# Patient Record
Sex: FEMALE | Race: BLACK OR AFRICAN AMERICAN | Employment: UNEMPLOYED | URBAN - METROPOLITAN AREA
[De-identification: names, ages, dates, MRNs, and addresses within clinical notes are randomized per-mention and may not be internally consistent; named-entity substitution may affect disease eponyms.]

---

## 2017-11-29 ENCOUNTER — HOSPITAL ENCOUNTER (EMERGENCY)
Age: 52
Discharge: HOME | End: 2017-11-29
Attending: EMERGENCY MEDICINE
Payer: SELF-PAY

## 2017-11-29 ENCOUNTER — APPOINTMENT (OUTPATIENT)
Dept: GENERAL RADIOLOGY | Age: 52
End: 2017-11-29
Attending: PHYSICIAN ASSISTANT
Payer: SELF-PAY

## 2017-11-29 VITALS
WEIGHT: 172 LBS | HEART RATE: 85 BPM | SYSTOLIC BLOOD PRESSURE: 132 MMHG | OXYGEN SATURATION: 100 % | TEMPERATURE: 98.6 F | DIASTOLIC BLOOD PRESSURE: 74 MMHG | BODY MASS INDEX: 28.66 KG/M2 | HEIGHT: 65 IN | RESPIRATION RATE: 16 BRPM

## 2017-11-29 DIAGNOSIS — K59.00 CONSTIPATION, UNSPECIFIED CONSTIPATION TYPE: ICD-10-CM

## 2017-11-29 DIAGNOSIS — E87.6 HYPOKALEMIA: ICD-10-CM

## 2017-11-29 DIAGNOSIS — R10.11 RUQ ABDOMINAL PAIN: Primary | ICD-10-CM

## 2017-11-29 DIAGNOSIS — B37.31 VAGINAL YEAST INFECTION: ICD-10-CM

## 2017-11-29 DIAGNOSIS — R11.0 NAUSEA WITHOUT VOMITING: ICD-10-CM

## 2017-11-29 LAB
ALBUMIN SERPL-MCNC: 4.1 G/DL (ref 3.4–5)
ALBUMIN/GLOB SERPL: 0.8 {RATIO} (ref 0.8–1.7)
ALP SERPL-CCNC: 108 U/L (ref 45–117)
ALT SERPL-CCNC: 31 U/L (ref 13–56)
ANION GAP SERPL CALC-SCNC: 11 MMOL/L (ref 3–18)
APPEARANCE UR: CLEAR
AST SERPL-CCNC: 27 U/L (ref 15–37)
BASOPHILS # BLD: 0 K/UL (ref 0–0.06)
BASOPHILS NFR BLD: 0 % (ref 0–2)
BILIRUB SERPL-MCNC: 0.4 MG/DL (ref 0.2–1)
BILIRUB UR QL: NEGATIVE
BUN SERPL-MCNC: 11 MG/DL (ref 7–18)
BUN/CREAT SERPL: 13 (ref 12–20)
CALCIUM SERPL-MCNC: 9.3 MG/DL (ref 8.5–10.1)
CHLORIDE SERPL-SCNC: 102 MMOL/L (ref 100–108)
CO2 SERPL-SCNC: 27 MMOL/L (ref 21–32)
COLOR UR: YELLOW
CREAT SERPL-MCNC: 0.86 MG/DL (ref 0.6–1.3)
DIFFERENTIAL METHOD BLD: ABNORMAL
EOSINOPHIL # BLD: 0.1 K/UL (ref 0–0.4)
EOSINOPHIL NFR BLD: 1 % (ref 0–5)
ERYTHROCYTE [DISTWIDTH] IN BLOOD BY AUTOMATED COUNT: 14.3 % (ref 11.6–14.5)
GLOBULIN SER CALC-MCNC: 4.9 G/DL (ref 2–4)
GLUCOSE SERPL-MCNC: 102 MG/DL (ref 74–99)
GLUCOSE UR STRIP.AUTO-MCNC: NEGATIVE MG/DL
HCT VFR BLD AUTO: 41.2 % (ref 35–45)
HGB BLD-MCNC: 13.5 G/DL (ref 12–16)
HGB UR QL STRIP: NEGATIVE
KETONES UR QL STRIP.AUTO: NEGATIVE MG/DL
LEUKOCYTE ESTERASE UR QL STRIP.AUTO: NEGATIVE
LIPASE SERPL-CCNC: 90 U/L (ref 73–393)
LYMPHOCYTES # BLD: 1.7 K/UL (ref 0.9–3.6)
LYMPHOCYTES NFR BLD: 31 % (ref 21–52)
MCH RBC QN AUTO: 28 PG (ref 24–34)
MCHC RBC AUTO-ENTMCNC: 32.8 G/DL (ref 31–37)
MCV RBC AUTO: 85.3 FL (ref 74–97)
MONOCYTES # BLD: 0.6 K/UL (ref 0.05–1.2)
MONOCYTES NFR BLD: 11 % (ref 3–10)
NEUTS SEG # BLD: 3 K/UL (ref 1.8–8)
NEUTS SEG NFR BLD: 57 % (ref 40–73)
NITRITE UR QL STRIP.AUTO: NEGATIVE
PH UR STRIP: 5.5 [PH] (ref 5–8)
PLATELET # BLD AUTO: 252 K/UL (ref 135–420)
PMV BLD AUTO: 11.7 FL (ref 9.2–11.8)
POTASSIUM SERPL-SCNC: 3.1 MMOL/L (ref 3.5–5.5)
PROT SERPL-MCNC: 9 G/DL (ref 6.4–8.2)
PROT UR STRIP-MCNC: NEGATIVE MG/DL
RBC # BLD AUTO: 4.83 M/UL (ref 4.2–5.3)
SERVICE CMNT-IMP: NORMAL
SODIUM SERPL-SCNC: 140 MMOL/L (ref 136–145)
SP GR UR REFRACTOMETRY: 1.02 (ref 1–1.03)
UROBILINOGEN UR QL STRIP.AUTO: 1 EU/DL (ref 0.2–1)
WBC # BLD AUTO: 5.3 K/UL (ref 4.6–13.2)
WET PREP GENITAL: NORMAL

## 2017-11-29 PROCEDURE — 85025 COMPLETE CBC W/AUTO DIFF WBC: CPT | Performed by: PHYSICIAN ASSISTANT

## 2017-11-29 PROCEDURE — 74011250637 HC RX REV CODE- 250/637: Performed by: PHYSICIAN ASSISTANT

## 2017-11-29 PROCEDURE — 99284 EMERGENCY DEPT VISIT MOD MDM: CPT

## 2017-11-29 PROCEDURE — 87210 SMEAR WET MOUNT SALINE/INK: CPT | Performed by: PHYSICIAN ASSISTANT

## 2017-11-29 PROCEDURE — 74020 XR ABD FLAT/ ERECT: CPT

## 2017-11-29 PROCEDURE — 87491 CHLMYD TRACH DNA AMP PROBE: CPT | Performed by: PHYSICIAN ASSISTANT

## 2017-11-29 PROCEDURE — 80053 COMPREHEN METABOLIC PANEL: CPT | Performed by: PHYSICIAN ASSISTANT

## 2017-11-29 PROCEDURE — 81003 URINALYSIS AUTO W/O SCOPE: CPT | Performed by: PHYSICIAN ASSISTANT

## 2017-11-29 PROCEDURE — 83690 ASSAY OF LIPASE: CPT | Performed by: PHYSICIAN ASSISTANT

## 2017-11-29 RX ORDER — DEXLANSOPRAZOLE 60 MG/1
60 CAPSULE, DELAYED RELEASE ORAL DAILY
COMMUNITY
End: 2020-05-21

## 2017-11-29 RX ORDER — FLUCONAZOLE 150 MG/1
150 TABLET ORAL
Qty: 1 TAB | Refills: 0 | Status: SHIPPED | OUTPATIENT
Start: 2017-11-29 | End: 2017-11-29

## 2017-11-29 RX ORDER — POTASSIUM CHLORIDE 1500 MG/1
20 TABLET, FILM COATED, EXTENDED RELEASE ORAL 2 TIMES DAILY
Qty: 6 TAB | Refills: 0 | Status: SHIPPED | OUTPATIENT
Start: 2017-11-29 | End: 2020-05-21

## 2017-11-29 RX ORDER — ONDANSETRON 4 MG/1
4 TABLET, ORALLY DISINTEGRATING ORAL
Status: COMPLETED | OUTPATIENT
Start: 2017-11-29 | End: 2017-11-29

## 2017-11-29 RX ORDER — AMLODIPINE BESYLATE 10 MG/1
10 TABLET ORAL DAILY
COMMUNITY

## 2017-11-29 RX ORDER — POLYETHYLENE GLYCOL 3350 17 G/17G
17 POWDER, FOR SOLUTION ORAL DAILY
Qty: 289 G | Refills: 0 | OUTPATIENT
Start: 2017-11-29 | End: 2021-03-07

## 2017-11-29 RX ORDER — POTASSIUM CHLORIDE 20 MEQ/1
40 TABLET, EXTENDED RELEASE ORAL
Status: COMPLETED | OUTPATIENT
Start: 2017-11-29 | End: 2017-11-29

## 2017-11-29 RX ADMIN — POTASSIUM CHLORIDE 40 MEQ: 20 TABLET, EXTENDED RELEASE ORAL at 14:24

## 2017-11-29 RX ADMIN — ONDANSETRON 4 MG: 4 TABLET, ORALLY DISINTEGRATING ORAL at 14:24

## 2017-11-29 NOTE — ED PROVIDER NOTES
HPI Comments: 47 yr old female, hx htn, anemia, and reflux presents to the ED complaining of RUQ pain and nausea without vomiting intermittently x several weeks. Pt states she had an episode of diarrhea last week that resolved. Denies SOB, chest pain, fever/chills, and urinary sx. Pt states she usually has regular BMs but has not been regular for a few weeks. Pt reports previous cholecystectomy. Pt does report some vaginal discharge and itching for the past few days. No other complaints. Patient is a 47 y.o. female presenting with abdominal pain and nausea. Abdominal Pain    Associated symptoms include diarrhea and nausea. Pertinent negatives include no fever, no vomiting, no constipation, no dysuria, no frequency, no hematuria, no headaches, no myalgias, no chest pain and no back pain. Nausea    Associated symptoms include abdominal pain and diarrhea. Pertinent negatives include no chills, no fever, no headaches, no myalgias, no cough and no headaches. Past Medical History:   Diagnosis Date    Acid reflux     Anemia     HTN (hypertension)        Past Surgical History:   Procedure Laterality Date    HX CHOLECYSTECTOMY      HX TUBAL LIGATION           History reviewed. No pertinent family history. Social History     Social History    Marital status: SINGLE     Spouse name: N/A    Number of children: N/A    Years of education: N/A     Occupational History    Not on file. Social History Main Topics    Smoking status: Never Smoker    Smokeless tobacco: Not on file    Alcohol use Yes      Comment: occasional    Drug use: No    Sexual activity: Not on file     Other Topics Concern    Not on file     Social History Narrative         ALLERGIES: Review of patient's allergies indicates no known allergies. Review of Systems   Constitutional: Negative. Negative for chills, diaphoresis, fatigue and fever. HENT: Negative. Negative for congestion, ear pain, rhinorrhea and sore throat. Eyes: Negative. Negative for pain and redness. Respiratory: Negative. Negative for cough, shortness of breath, wheezing and stridor. Cardiovascular: Negative. Negative for chest pain, palpitations and leg swelling. Gastrointestinal: Positive for abdominal pain, diarrhea and nausea. Negative for constipation and vomiting. Endocrine: Negative. Genitourinary: Positive for vaginal discharge. Negative for dysuria, flank pain, frequency and hematuria. Musculoskeletal: Negative. Negative for back pain, myalgias, neck pain and neck stiffness. Skin: Negative. Negative for rash and wound. Allergic/Immunologic: Negative. Neurological: Negative. Negative for dizziness, seizures, syncope and headaches. Hematological: Negative. Psychiatric/Behavioral: Negative. All other systems reviewed and are negative. Vitals:    11/29/17 1123   BP: 132/74   Pulse: 85   Resp: 16   Temp: 98.6 °F (37 °C)   SpO2: 100%   Weight: 78 kg (172 lb)   Height: 5' 5\" (1.651 m)            Physical Exam   Constitutional: She is oriented to person, place, and time. She appears well-developed and well-nourished. No distress. HENT:   Head: Normocephalic. Neck: Normal range of motion. Neck supple. Cardiovascular: Normal rate, regular rhythm and normal heart sounds. Exam reveals no gallop and no friction rub. No murmur heard. Pulmonary/Chest: Effort normal and breath sounds normal. No stridor. No respiratory distress. She has no wheezes. She has no rales. Abdominal: Soft. Bowel sounds are normal. She exhibits no distension and no mass. There is no tenderness. There is no rebound and no guarding. No abdominal tenderness noted   Genitourinary: Vaginal discharge found. Genitourinary Comments: Moderate amount of brown-yellow discharge noted on pelvic exam, no palpable mass or adnexal tenderness noted   Musculoskeletal: Normal range of motion.    Neurological: She is alert and oriented to person, place, and time. Coordination normal.   Gait is steady. Able to ambulate without difficulty. Skin: Skin is warm and dry. No rash noted. She is not diaphoretic. No erythema. Psychiatric: She has a normal mood and affect. Her behavior is normal. Thought content normal.   Nursing note and vitals reviewed. MDM  Number of Diagnoses or Management Options  Constipation, unspecified constipation type:   Hypokalemia:   Nausea without vomiting:   RUQ abdominal pain:   Vaginal yeast infection:   Diagnosis management comments: Impression:  abd pain RUQ, constipation, N without V, hypokalemia, vaginal yeast infection     UA unremarkable  Wet prep: mod yeast  Monos 11, K 3.1, glucose 102, TP 9, GLOB 4.9, lipase unremarkable  X-ray abd: 1. Moderate overall stool burden.     2. No obstruction, ileus, or free air.     3. Probable prior cholecystectomy.       Discussed these results with the pt. Potassium chloride and zofran given in the ED  No indication of PE noted at this time, pt is not tachycardiac or hypoxic and denies SOB. Patient is stable for discharge at this time. Rx for potassium chloride, diflucan, miralax, and zofran given. Rest and follow-up with PCP this week. Return to the ED immediately for any new or worsening sx.   Mary Beth Tomlin PA-C 2:08 PM        Amount and/or Complexity of Data Reviewed  Clinical lab tests: ordered and reviewed  Tests in the radiology section of CPT®: reviewed and ordered    Risk of Complications, Morbidity, and/or Mortality  Presenting problems: moderate  Diagnostic procedures: moderate  Management options: moderate    Patient Progress  Patient progress: stable    ED Course       Procedures

## 2017-11-29 NOTE — DISCHARGE INSTRUCTIONS
Abdominal Pain: Care Instructions  Your Care Instructions    Abdominal pain has many possible causes. Some aren't serious and get better on their own in a few days. Others need more testing and treatment. If your pain continues or gets worse, you need to be rechecked and may need more tests to find out what is wrong. You may need surgery to correct the problem. Don't ignore new symptoms, such as fever, nausea and vomiting, urination problems, pain that gets worse, and dizziness. These may be signs of a more serious problem. Your doctor may have recommended a follow-up visit in the next 8 to 12 hours. If you are not getting better, you may need more tests or treatment. The doctor has checked you carefully, but problems can develop later. If you notice any problems or new symptoms, get medical treatment right away. Follow-up care is a key part of your treatment and safety. Be sure to make and go to all appointments, and call your doctor if you are having problems. It's also a good idea to know your test results and keep a list of the medicines you take. How can you care for yourself at home? · Rest until you feel better. · To prevent dehydration, drink plenty of fluids, enough so that your urine is light yellow or clear like water. Choose water and other caffeine-free clear liquids until you feel better. If you have kidney, heart, or liver disease and have to limit fluids, talk with your doctor before you increase the amount of fluids you drink. · If your stomach is upset, eat mild foods, such as rice, dry toast or crackers, bananas, and applesauce. Try eating several small meals instead of two or three large ones. · Wait until 48 hours after all symptoms have gone away before you have spicy foods, alcohol, and drinks that contain caffeine. · Do not eat foods that are high in fat. · Avoid anti-inflammatory medicines such as aspirin, ibuprofen (Advil, Motrin), and naproxen (Aleve).  These can cause stomach upset. Talk to your doctor if you take daily aspirin for another health problem. When should you call for help? Call 911 anytime you think you may need emergency care. For example, call if:  ? · You passed out (lost consciousness). ? · You pass maroon or very bloody stools. ? · You vomit blood or what looks like coffee grounds. ? · You have new, severe belly pain. ?Call your doctor now or seek immediate medical care if:  ? · Your pain gets worse, especially if it becomes focused in one area of your belly. ? · You have a new or higher fever. ? · Your stools are black and look like tar, or they have streaks of blood. ? · You have unexpected vaginal bleeding. ? · You have symptoms of a urinary tract infection. These may include:  ¨ Pain when you urinate. ¨ Urinating more often than usual.  ¨ Blood in your urine. ? · You are dizzy or lightheaded, or you feel like you may faint. ? Watch closely for changes in your health, and be sure to contact your doctor if:  ? · You are not getting better after 1 day (24 hours). Where can you learn more? Go to http://radhaiCrumzkimberly.info/. Enter H932 in the search box to learn more about \"Abdominal Pain: Care Instructions. \"  Current as of: March 20, 2017  Content Version: 11.4  © 2476-7811 [employer]. Care instructions adapted under license by [employer]â„¢ (which disclaims liability or warranty for this information). If you have questions about a medical condition or this instruction, always ask your healthcare professional. [place] [address] any warranty or liability for your use of this information. Constipation: Care Instructions  Your Care Instructions    Constipation means that you have a hard time passing stools (bowel movements). People pass stools from 3 times a day to once every 3 days. What is normal for you may be different.  Constipation may occur with pain in the rectum and cramping. The pain may get worse when you try to pass stools. Sometimes there are small amounts of bright red blood on toilet paper or the surface of stools. This is because of enlarged veins near the rectum (hemorrhoids). A few changes in your diet and lifestyle may help you avoid ongoing constipation. Your doctor may also prescribe medicine to help loosen your stool. Some medicines can cause constipation. These include pain medicines and antidepressants. Tell your doctor about all the medicines you take. Your doctor may want to make a medicine change to ease your symptoms. Follow-up care is a key part of your treatment and safety. Be sure to make and go to all appointments, and call your doctor if you are having problems. It's also a good idea to know your test results and keep a list of the medicines you take. How can you care for yourself at home? · Drink plenty of fluids, enough so that your urine is light yellow or clear like water. If you have kidney, heart, or liver disease and have to limit fluids, talk with your doctor before you increase the amount of fluids you drink. · Include high-fiber foods in your diet each day. These include fruits, vegetables, beans, and whole grains. · Get at least 30 minutes of exercise on most days of the week. Walking is a good choice. You also may want to do other activities, such as running, swimming, cycling, or playing tennis or team sports. · Take a fiber supplement, such as Citrucel or Metamucil, every day. Read and follow all instructions on the label. · Schedule time each day for a bowel movement. A daily routine may help. Take your time having your bowel movement. · Support your feet with a small step stool when you sit on the toilet. This helps flex your hips and places your pelvis in a squatting position. · Your doctor may recommend an over-the-counter laxative to relieve your constipation. Examples are Milk of Magnesia and MiraLax.  Read and follow all instructions on the label. Do not use laxatives on a long-term basis. When should you call for help? Call your doctor now or seek immediate medical care if:  ? · You have new or worse belly pain. ? · You have new or worse nausea or vomiting. ? · You have blood in your stools. ? Watch closely for changes in your health, and be sure to contact your doctor if:  ? · Your constipation is getting worse. ? · You do not get better as expected. Where can you learn more? Go to http://radha-kimberly.info/. Enter 21  in the search box to learn more about \"Constipation: Care Instructions. \"  Current as of: 2017  Content Version: 11.4  © 1470-7161 [employer]. Care instructions adapted under license by [employer] (which disclaims liability or warranty for this information). If you have questions about a medical condition or this instruction, always ask your healthcare professional. [place] [address] any warranty or liability for your use of this information. [employer] Activation    Thank you for requesting access to [employer]. Please follow the instructions below to securely access and download your online medical record. [employer] allows you to send messages to your doctor, view your test results, renew your prescriptions, schedule appointments, and more. How Do I Sign Up? 1. In your internet browser, go to [email or web address]  2. Click on the First Time User? Click Here link in the Sign In box. You will be redirect to the New Member Sign Up page. 3. Enter your [employer] Access Code exactly as it appears below. You will not need to use this code after youve completed the sign-up process. If you do not sign up before the expiration date, you must request a new code. [employer] Access Code: [email or web address]  Expires: 2018 11:28 AM (This is the date your [employer] access code will )    4.  Enter the last four digits of your Social Security Number (xxxx) and Date of Birth (mm/dd/yyyy) as indicated and click Submit. You will be taken to the next sign-up page. 5. Create a TaxiMe ID. This will be your [employer] login ID and cannot be changed, so think of one that is secure and easy to remember. 6. Create a [employer] password. You can change your password at any time. 7. Enter your Password Reset Question and Answer. This can be used at a later time if you forget your password. 8. Enter your e-mail address. You will receive e-mail notification when new information is available in [address] 9. Click Sign Up. You can now view and download portions of your medical record. 10. Click the Download Summary menu link to download a portable copy of your medical information. Additional Information    If you have questions, please visit the Frequently Asked Questions section of the [employer] website at [email or web address] Remember, [employer] is NOT to be used for urgent needs. For medical emergencies, dial 911.

## 2017-11-29 NOTE — ED TRIAGE NOTES
Patient states right upper quadrant abdominal pain intermittently x one month. States recent episodes of diarrhea that has resolved. C/o ongoing nausea. Advises that she has had gallbladder removed.

## 2017-11-30 LAB
C TRACH RRNA SPEC QL NAA+PROBE: NEGATIVE
N GONORRHOEA RRNA SPEC QL NAA+PROBE: NEGATIVE
SPECIMEN SOURCE: NORMAL

## 2020-05-21 ENCOUNTER — HOSPITAL ENCOUNTER (EMERGENCY)
Age: 55
Discharge: HOME | End: 2020-05-21
Attending: EMERGENCY MEDICINE
Payer: MEDICAID

## 2020-05-21 ENCOUNTER — APPOINTMENT (OUTPATIENT)
Dept: GENERAL RADIOLOGY | Age: 55
End: 2020-05-21
Attending: EMERGENCY MEDICINE
Payer: MEDICAID

## 2020-05-21 VITALS
BODY MASS INDEX: 30.32 KG/M2 | DIASTOLIC BLOOD PRESSURE: 80 MMHG | RESPIRATION RATE: 16 BRPM | OXYGEN SATURATION: 98 % | HEART RATE: 89 BPM | TEMPERATURE: 98 F | SYSTOLIC BLOOD PRESSURE: 131 MMHG | WEIGHT: 182 LBS | HEIGHT: 65 IN

## 2020-05-21 DIAGNOSIS — R07.89 ATYPICAL CHEST PAIN: Primary | ICD-10-CM

## 2020-05-21 DIAGNOSIS — K21.9 GASTROESOPHAGEAL REFLUX DISEASE, ESOPHAGITIS PRESENCE NOT SPECIFIED: ICD-10-CM

## 2020-05-21 LAB
ANION GAP SERPL CALC-SCNC: 5 MMOL/L (ref 3–18)
APPEARANCE UR: CLEAR
ATRIAL RATE: 87 BPM
BACTERIA URNS QL MICRO: NEGATIVE /HPF
BASOPHILS # BLD: 0 K/UL (ref 0–0.1)
BASOPHILS NFR BLD: 0 % (ref 0–2)
BILIRUB UR QL: NEGATIVE
BUN SERPL-MCNC: 11 MG/DL (ref 7–18)
BUN/CREAT SERPL: 13 (ref 12–20)
CALCIUM SERPL-MCNC: 9.1 MG/DL (ref 8.5–10.1)
CALCULATED P AXIS, ECG09: 63 DEGREES
CALCULATED R AXIS, ECG10: -5 DEGREES
CALCULATED T AXIS, ECG11: 71 DEGREES
CHLORIDE SERPL-SCNC: 103 MMOL/L (ref 100–111)
CK MB CFR SERPL CALC: NORMAL % (ref 0–4)
CK MB SERPL-MCNC: <1 NG/ML (ref 5–25)
CK SERPL-CCNC: 94 U/L (ref 26–192)
CO2 SERPL-SCNC: 28 MMOL/L (ref 21–32)
COLOR UR: YELLOW
CREAT SERPL-MCNC: 0.88 MG/DL (ref 0.6–1.3)
DIAGNOSIS, 93000: NORMAL
DIFFERENTIAL METHOD BLD: ABNORMAL
EOSINOPHIL # BLD: 0.1 K/UL (ref 0–0.4)
EOSINOPHIL NFR BLD: 1 % (ref 0–5)
EPITH CASTS URNS QL MICRO: NORMAL /LPF (ref 0–5)
ERYTHROCYTE [DISTWIDTH] IN BLOOD BY AUTOMATED COUNT: 13.6 % (ref 11.6–14.5)
GLUCOSE SERPL-MCNC: 111 MG/DL (ref 74–99)
GLUCOSE UR STRIP.AUTO-MCNC: NEGATIVE MG/DL
HCT VFR BLD AUTO: 46.5 % (ref 35–45)
HGB BLD-MCNC: 15.8 G/DL (ref 12–16)
HGB UR QL STRIP: ABNORMAL
KETONES UR QL STRIP.AUTO: NEGATIVE MG/DL
LEUKOCYTE ESTERASE UR QL STRIP.AUTO: NEGATIVE
LYMPHOCYTES # BLD: 1.8 K/UL (ref 0.9–3.6)
LYMPHOCYTES NFR BLD: 24 % (ref 21–52)
MCH RBC QN AUTO: 31 PG (ref 24–34)
MCHC RBC AUTO-ENTMCNC: 34 G/DL (ref 31–37)
MCV RBC AUTO: 91.4 FL (ref 74–97)
MONOCYTES # BLD: 0.5 K/UL (ref 0.05–1.2)
MONOCYTES NFR BLD: 7 % (ref 3–10)
NEUTS SEG # BLD: 5.1 K/UL (ref 1.8–8)
NEUTS SEG NFR BLD: 68 % (ref 40–73)
NITRITE UR QL STRIP.AUTO: NEGATIVE
P-R INTERVAL, ECG05: 170 MS
PH UR STRIP: 5.5 [PH] (ref 5–8)
PLATELET # BLD AUTO: 219 K/UL (ref 135–420)
PMV BLD AUTO: 11.8 FL (ref 9.2–11.8)
POTASSIUM SERPL-SCNC: 3.4 MMOL/L (ref 3.5–5.5)
PROT UR STRIP-MCNC: NEGATIVE MG/DL
Q-T INTERVAL, ECG07: 384 MS
QRS DURATION, ECG06: 104 MS
QTC CALCULATION (BEZET), ECG08: 462 MS
RBC # BLD AUTO: 5.09 M/UL (ref 4.2–5.3)
RBC #/AREA URNS HPF: NORMAL /HPF (ref 0–5)
SODIUM SERPL-SCNC: 136 MMOL/L (ref 136–145)
SP GR UR REFRACTOMETRY: 1.02 (ref 1–1.03)
TROPONIN I SERPL-MCNC: <0.02 NG/ML (ref 0–0.04)
UROBILINOGEN UR QL STRIP.AUTO: 0.2 EU/DL (ref 0.2–1)
VENTRICULAR RATE, ECG03: 87 BPM
WBC # BLD AUTO: 7.5 K/UL (ref 4.6–13.2)
WBC URNS QL MICRO: NEGATIVE /HPF (ref 0–4)

## 2020-05-21 PROCEDURE — 74011000250 HC RX REV CODE- 250: Performed by: EMERGENCY MEDICINE

## 2020-05-21 PROCEDURE — 71046 X-RAY EXAM CHEST 2 VIEWS: CPT

## 2020-05-21 PROCEDURE — 82550 ASSAY OF CK (CPK): CPT

## 2020-05-21 PROCEDURE — 93005 ELECTROCARDIOGRAM TRACING: CPT

## 2020-05-21 PROCEDURE — 81001 URINALYSIS AUTO W/SCOPE: CPT

## 2020-05-21 PROCEDURE — 74011250637 HC RX REV CODE- 250/637: Performed by: EMERGENCY MEDICINE

## 2020-05-21 PROCEDURE — 80048 BASIC METABOLIC PNL TOTAL CA: CPT

## 2020-05-21 PROCEDURE — 99284 EMERGENCY DEPT VISIT MOD MDM: CPT

## 2020-05-21 PROCEDURE — 85025 COMPLETE CBC W/AUTO DIFF WBC: CPT

## 2020-05-21 RX ORDER — RABEPRAZOLE SODIUM 20 MG/1
20 TABLET, DELAYED RELEASE ORAL DAILY
Qty: 20 TAB | Refills: 0 | Status: SHIPPED | OUTPATIENT
Start: 2020-05-21

## 2020-05-21 RX ORDER — SPIRONOLACTONE 25 MG/1
25 TABLET ORAL DAILY
COMMUNITY
Start: 2020-03-10 | End: 2020-10-04

## 2020-05-21 RX ORDER — MELOXICAM 7.5 MG/1
TABLET ORAL
COMMUNITY
Start: 2019-10-10 | End: 2020-10-04

## 2020-05-21 RX ORDER — LOSARTAN POTASSIUM 50 MG/1
TABLET ORAL
COMMUNITY
Start: 2020-02-13

## 2020-05-21 RX ORDER — OMEPRAZOLE 20 MG/1
20 CAPSULE, DELAYED RELEASE ORAL
COMMUNITY
Start: 2020-03-09 | End: 2020-05-21

## 2020-05-21 RX ADMIN — LIDOCAINE HYDROCHLORIDE 30 ML: 20 SOLUTION ORAL; TOPICAL at 11:45

## 2020-05-21 NOTE — ED PROVIDER NOTES
21-year-old female history of hypertension, borderline diabetes presents for evaluation of burning epigastric and chest pain. Patient has a history of reflux. She is maintained on omeprazole for same. Previously the medication appeared to be working well for her but over the last few weeks she has had recurrence of symptoms. Reports an episode of burning pain radiating from the epigastrium into the chest this morning. Had similar episode 2 days ago and more frequent symptoms over the last couple of weeks. Currently resting comfortably. No associated shortness of breath, diaphoresis, radiation of discomfort into jaw or arm. Non-smoker. Family history negative for MI. Past Medical History:   Diagnosis Date    Acid reflux     Anemia     HTN (hypertension)        Past Surgical History:   Procedure Laterality Date    HX CHOLECYSTECTOMY      HX TUBAL LIGATION           History reviewed. No pertinent family history.     Social History     Socioeconomic History    Marital status: SINGLE     Spouse name: Not on file    Number of children: Not on file    Years of education: Not on file    Highest education level: Not on file   Occupational History    Not on file   Social Needs    Financial resource strain: Not on file    Food insecurity     Worry: Not on file     Inability: Not on file    Transportation needs     Medical: Not on file     Non-medical: Not on file   Tobacco Use    Smoking status: Never Smoker   Substance and Sexual Activity    Alcohol use: Yes     Comment: occasional    Drug use: No    Sexual activity: Not on file   Lifestyle    Physical activity     Days per week: Not on file     Minutes per session: Not on file    Stress: Not on file   Relationships    Social connections     Talks on phone: Not on file     Gets together: Not on file     Attends Gnosticist service: Not on file     Active member of club or organization: Not on file     Attends meetings of clubs or organizations: Not on file     Relationship status: Not on file    Intimate partner violence     Fear of current or ex partner: Not on file     Emotionally abused: Not on file     Physically abused: Not on file     Forced sexual activity: Not on file   Other Topics Concern    Not on file   Social History Narrative    Not on file         ALLERGIES: Patient has no known allergies. Review of Systems   Respiratory: Negative for shortness of breath. Cardiovascular: Positive for chest pain. Gastrointestinal: Positive for abdominal pain. All other systems reviewed and are negative. Vitals:    05/21/20 1046   BP: 173/87   Pulse: 89   Resp: 16   Temp: 98 °F (36.7 °C)   SpO2: 97%   Weight: 82.6 kg (182 lb)   Height: 5' 5\" (1.651 m)            Physical Exam  Vitals signs and nursing note reviewed. Constitutional:       General: She is not in acute distress. Appearance: She is well-developed. HENT:      Head: Normocephalic and atraumatic. Eyes:      General: No scleral icterus. Cardiovascular:      Rate and Rhythm: Normal rate and regular rhythm. Pulmonary:      Effort: Pulmonary effort is normal. No respiratory distress. Breath sounds: No wheezing. Chest:      Chest wall: Tenderness (mild sternal tenderness) present. Abdominal:      Tenderness: There is abdominal tenderness (mild epigastric). Musculoskeletal:         General: No swelling. Skin:     General: Skin is warm and dry. Neurological:      General: No focal deficit present. Mental Status: She is alert and oriented to person, place, and time. EKG interpreted at 1051 hrs. from myself demonstrates sinus rhythm, rate 87. Left axis deviation pattern present. No ST segment elevations. Q waves present in leads V1 and V2 (no old EKGs available for comparison).       Recent Results (from the past 12 hour(s))   EKG, 12 LEAD, INITIAL    Collection Time: 05/21/20 10:49 AM   Result Value Ref Range    Ventricular Rate 87 BPM Atrial Rate 87 BPM    P-R Interval 170 ms    QRS Duration 104 ms    Q-T Interval 384 ms    QTC Calculation (Bezet) 462 ms    Calculated P Axis 63 degrees    Calculated R Axis -5 degrees    Calculated T Axis 71 degrees    Diagnosis       Normal sinus rhythm  Possible Left atrial enlargement  Incomplete right bundle branch block  Abnormal ECG  No previous ECGs available  Confirmed by Erica Parker ([id number]) on 5/21/2020 11:34:17 AM     CARDIAC PANEL,(CK, CKMB & TROPONIN)    Collection Time: 05/21/20 11:00 AM   Result Value Ref Range    CK - MB <1.0 <3.6 ng/ml    CK-MB Index  0.0 - 4.0 %     CALCULATION NOT PERFORMED WHEN RESULT IS BELOW LINEAR LIMIT    CK 94 26 - 192 U/L    Troponin-I, QT <0.02 0.0 - 0.045 NG/ML   URINALYSIS W/ RFLX MICROSCOPIC    Collection Time: 05/21/20 11:00 AM   Result Value Ref Range    Color YELLOW      Appearance CLEAR      Specific gravity 1.016 1.005 - 1.030      pH (UA) 5.5 5.0 - 8.0      Protein Negative NEG mg/dL    Glucose Negative NEG mg/dL    Ketone Negative NEG mg/dL    Bilirubin Negative NEG      Blood TRACE (A) NEG      Urobilinogen 0.2 0.2 - 1.0 EU/dL    Nitrites Negative NEG      Leukocyte Esterase Negative NEG     CBC WITH AUTOMATED DIFF    Collection Time: 05/21/20 11:00 AM   Result Value Ref Range    WBC 7.5 4.6 - 13.2 K/uL    RBC 5.09 4. 20 - 5.30 M/uL    HGB 15.8 12.0 - 16.0 g/dL    HCT 46.5 (H) 35.0 - 45.0 %    MCV 91.4 74.0 - 97.0 FL    MCH 31.0 24.0 - 34.0 PG    MCHC 34.0 31.0 - 37.0 g/dL    RDW 13.6 11.6 - 14.5 %    PLATELET 119 745 - 585 K/uL    MPV 11.8 9.2 - 11.8 FL    NEUTROPHILS 68 40 - 73 %    LYMPHOCYTES 24 21 - 52 %    MONOCYTES 7 3 - 10 %    EOSINOPHILS 1 0 - 5 %    BASOPHILS 0 0 - 2 %    ABS. NEUTROPHILS 5.1 1.8 - 8.0 K/UL    ABS. LYMPHOCYTES 1.8 0.9 - 3.6 K/UL    ABS. MONOCYTES 0.5 0.05 - 1.2 K/UL    ABS. EOSINOPHILS 0.1 0.0 - 0.4 K/UL    ABS.  BASOPHILS 0.0 0.0 - 0.1 K/UL    DF AUTOMATED     METABOLIC PANEL, BASIC    Collection Time: 05/21/20 11:00 AM   Result Value Ref Range    Sodium 136 136 - 145 mmol/L    Potassium 3.4 (L) 3.5 - 5.5 mmol/L    Chloride 103 100 - 111 mmol/L    CO2 28 21 - 32 mmol/L    Anion gap 5 3.0 - 18 mmol/L    Glucose 111 (H) 74 - 99 mg/dL    BUN 11 7.0 - 18 MG/DL    Creatinine 0.88 0.6 - 1.3 MG/DL    BUN/Creatinine ratio 13 12 - 20      GFR est AA >60 >60 ml/min/1.73m2    GFR est non-AA >60 >60 ml/min/1.73m2    Calcium 9.1 8.5 - 10.1 MG/DL   URINE MICROSCOPIC ONLY    Collection Time: 05/21/20 11:00 AM   Result Value Ref Range    WBC Negative 0 - 4 /hpf    RBC 0 to 3 0 - 5 /hpf    Epithelial cells 1+ 0 - 5 /lpf    Bacteria Negative NEG /hpf     XR CHEST PA LAT   Final Result   Impression:      No radiographic evidence of acute cardiopulmonary process. MDM  Number of Diagnoses or Management Options  Diagnosis management comments: Impression: Epigastric and chest pain with historical features of gastroesophageal reflux disease. Also consider atypical ACS. Screening EKG is within normal limits. Cardiac enzymes reviewed. Patient treated with GI cocktail. Procedures    Diagnosis:   1. Atypical chest pain    2. Gastroesophageal reflux disease, esophagitis presence not specified      1. EKG chest x-ray and heart muscle enzyme testing (troponin) today. 2.  Symptoms are suggestive of gastroesophageal reflux disease exacerbation. Cannot completely rule out the possibility that this represents cardiac related pain although above studies are reassuring. 3.  Given the lack of good response to omeprazole switch proton pump inhibitor drug to AcipHex take as prescribed. 4.  Avoid acidic foods, fatty foods, spicy foods, and eating within 2 to 3 hours prior to bedtime. 5.  Follow-up with your primary care physician for recheck in 7 to 10 days. 6.  Return to the emergency department for severe pain, repetitive vomiting, shortness of breath, or other acutely worsening symptoms.     Disposition: home    Follow-up Information     Follow up With Specialties Details Why Contact Info    Jacy Morrison,  Family Practice In 1 week for recheck of ongoing symptoms [id number] [facility] [id number] [phone] [address] [facility] EMERGENCY DEPT Emergency Medicine  As needed, If symptoms worsen [address] [facility]  [phone]          Patient's Medications   Start Taking    RABEPRAZOLE (ACIPHEX) 20 MG TABLET    Take 1 Tab by mouth daily. Continue Taking    AMLODIPINE (NORVASC) 10 MG TABLET    Take 10 mg by mouth daily. LOSARTAN (COZAAR) 50 MG TABLET    Take one po daily for blood pressure    MELOXICAM (MOBIC) 7.5 MG TABLET    Take one tab every 12 hours (with food), as needed for pain; use lowest effective dose, for the shortest effective treatment duration    POLYETHYLENE GLYCOL (MIRALAX) 17 GRAM/DOSE POWDER    Take 17 g by mouth daily. 1 tablespoon with 8 oz of water daily    SPIRONOLACTONE (ALDACTONE) 25 MG TABLET    Take 25 mg by mouth daily. These Medications have changed    No medications on file   Stop Taking    DEXLANSOPRAZOLE (DEXILANT) 60 MG CPDB    Take 60 mg by mouth daily. OMEPRAZOLE (PRILOSEC) 20 MG CAPSULE    Take 20 mg by mouth. POTASSIUM CHLORIDE SR (K-TAB) 20 MEQ TABLET    Take 1 Tab by mouth two (2) times a day.

## 2020-05-21 NOTE — DISCHARGE INSTRUCTIONS
1.  EKG chest x-ray and heart muscle enzyme testing (troponin) today. 2.  Symptoms are suggestive of gastroesophageal reflux disease exacerbation. Cannot completely rule out the possibility that this represents cardiac related pain although above studies are reassuring. 3.  Given the lack of good response to omeprazole switch proton pump inhibitor drug to AcipHex take as prescribed. 4.  Avoid acidic foods, fatty foods, spicy foods, and eating within 2 to 3 hours prior to bedtime. 5.  Follow-up with your primary care physician for recheck in 7 to 10 days. 6.  Return to the emergency department for severe pain, repetitive vomiting, shortness of breath, or other acutely worsening symptoms.

## 2020-05-21 NOTE — ED NOTES
Vipul Basilio is a 49 y.o. female that was discharged in good condition. The patients diagnosis, condition and treatment were explained to  patient and aftercare instructions were given. The patient verbalized understanding.

## 2020-10-04 ENCOUNTER — HOSPITAL ENCOUNTER (EMERGENCY)
Age: 55
Discharge: HOME | End: 2020-10-04
Attending: EMERGENCY MEDICINE
Payer: MEDICAID

## 2020-10-04 ENCOUNTER — APPOINTMENT (OUTPATIENT)
Dept: CT IMAGING | Age: 55
End: 2020-10-04
Attending: EMERGENCY MEDICINE
Payer: MEDICAID

## 2020-10-04 VITALS
HEIGHT: 65 IN | WEIGHT: 172 LBS | OXYGEN SATURATION: 99 % | BODY MASS INDEX: 28.66 KG/M2 | DIASTOLIC BLOOD PRESSURE: 86 MMHG | HEART RATE: 74 BPM | RESPIRATION RATE: 18 BRPM | SYSTOLIC BLOOD PRESSURE: 152 MMHG | TEMPERATURE: 97.9 F

## 2020-10-04 DIAGNOSIS — N85.2 ENLARGED UTERUS: ICD-10-CM

## 2020-10-04 DIAGNOSIS — R10.11 ABDOMINAL PAIN, RUQ (RIGHT UPPER QUADRANT): Primary | ICD-10-CM

## 2020-10-04 LAB
ALBUMIN SERPL-MCNC: 4 G/DL (ref 3.4–5)
ALBUMIN/GLOB SERPL: 0.9 {RATIO} (ref 0.8–1.7)
ALP SERPL-CCNC: 79 U/L (ref 45–117)
ALT SERPL-CCNC: 32 U/L (ref 13–56)
ANION GAP SERPL CALC-SCNC: 8 MMOL/L (ref 3–18)
AST SERPL-CCNC: 23 U/L (ref 10–38)
BASOPHILS # BLD: 0 K/UL (ref 0–0.1)
BASOPHILS NFR BLD: 1 % (ref 0–2)
BILIRUB SERPL-MCNC: 0.7 MG/DL (ref 0.2–1)
BUN SERPL-MCNC: 10 MG/DL (ref 7–18)
BUN/CREAT SERPL: 11 (ref 12–20)
CALCIUM SERPL-MCNC: 9.3 MG/DL (ref 8.5–10.1)
CHLORIDE SERPL-SCNC: 104 MMOL/L (ref 100–111)
CO2 SERPL-SCNC: 27 MMOL/L (ref 21–32)
CREAT SERPL-MCNC: 0.89 MG/DL (ref 0.6–1.3)
DIFFERENTIAL METHOD BLD: ABNORMAL
EOSINOPHIL # BLD: 0.1 K/UL (ref 0–0.4)
EOSINOPHIL NFR BLD: 2 % (ref 0–5)
ERYTHROCYTE [DISTWIDTH] IN BLOOD BY AUTOMATED COUNT: 13.1 % (ref 11.6–14.5)
GLOBULIN SER CALC-MCNC: 4.3 G/DL (ref 2–4)
GLUCOSE SERPL-MCNC: 94 MG/DL (ref 74–99)
HCT VFR BLD AUTO: 45 % (ref 35–45)
HGB BLD-MCNC: 15.1 G/DL (ref 12–16)
LIPASE SERPL-CCNC: 66 U/L (ref 73–393)
LYMPHOCYTES # BLD: 1.3 K/UL (ref 0.9–3.6)
LYMPHOCYTES NFR BLD: 24 % (ref 21–52)
MCH RBC QN AUTO: 30.6 PG (ref 24–34)
MCHC RBC AUTO-ENTMCNC: 33.6 G/DL (ref 31–37)
MCV RBC AUTO: 91.1 FL (ref 74–97)
MONOCYTES # BLD: 0.4 K/UL (ref 0.05–1.2)
MONOCYTES NFR BLD: 7 % (ref 3–10)
NEUTS SEG # BLD: 3.6 K/UL (ref 1.8–8)
NEUTS SEG NFR BLD: 66 % (ref 40–73)
PLATELET # BLD AUTO: 201 K/UL (ref 135–420)
PMV BLD AUTO: 12 FL (ref 9.2–11.8)
POTASSIUM SERPL-SCNC: 3.3 MMOL/L (ref 3.5–5.5)
PROT SERPL-MCNC: 8.3 G/DL (ref 6.4–8.2)
RBC # BLD AUTO: 4.94 M/UL (ref 4.2–5.3)
SODIUM SERPL-SCNC: 139 MMOL/L (ref 136–145)
WBC # BLD AUTO: 5.4 K/UL (ref 4.6–13.2)

## 2020-10-04 PROCEDURE — 99282 EMERGENCY DEPT VISIT SF MDM: CPT

## 2020-10-04 PROCEDURE — 74177 CT ABD & PELVIS W/CONTRAST: CPT

## 2020-10-04 PROCEDURE — 80053 COMPREHEN METABOLIC PANEL: CPT

## 2020-10-04 PROCEDURE — 83690 ASSAY OF LIPASE: CPT

## 2020-10-04 PROCEDURE — 74011000636 HC RX REV CODE- 636: Performed by: EMERGENCY MEDICINE

## 2020-10-04 PROCEDURE — 85025 COMPLETE CBC W/AUTO DIFF WBC: CPT

## 2020-10-04 RX ORDER — ATORVASTATIN CALCIUM 10 MG/1
10 TABLET, FILM COATED ORAL DAILY
COMMUNITY

## 2020-10-04 RX ORDER — FAMOTIDINE 20 MG/1
20 TABLET, FILM COATED ORAL DAILY
COMMUNITY
End: 2021-03-07

## 2020-10-04 RX ORDER — POTASSIUM CITRATE 10 MEQ/1
10 TABLET, EXTENDED RELEASE ORAL DAILY
COMMUNITY

## 2020-10-04 RX ADMIN — IOPAMIDOL 100 ML: 612 INJECTION, SOLUTION INTRAVENOUS at 12:22

## 2020-10-04 NOTE — DISCHARGE INSTRUCTIONS
1.  Routine screening labs all within normal limits. 2.  CT scan does not reveal any focal source for acute pain. 3.  Incidental note made of enlarged uterus on CT scanning. Recommend GYN follow-up for evaluation of same. Findings consistent with uterine fibroid. 4.  Follow-up with your gastroenterologist for endoscopy as currently scheduled. 5.  Continue current medications. 6.  [place] diet. 7.  Return to the emergency department for severe pain with fever, repetitive vomiting, or other acutely worsening symptoms. 8.  Recheck with PCP in 3 to 5 days if not improving. Patient Education        Abdominal Pain: Care Instructions  Your Care Instructions     Abdominal pain has many possible causes. Some aren't serious and get better on their own in a few days. Others need more testing and treatment. If your pain continues or gets worse, you need to be rechecked and may need more tests to find out what is wrong. You may need surgery to correct the problem. Don't ignore new symptoms, such as fever, nausea and vomiting, urination problems, pain that gets worse, and dizziness. These may be signs of a more serious problem. Your doctor may have recommended a follow-up visit in the next 8 to 12 hours. If you are not getting better, you may need more tests or treatment. The doctor has checked you carefully, but problems can develop later. If you notice any problems or new symptoms, get medical treatment right away. Follow-up care is a key part of your treatment and safety. Be sure to make and go to all appointments, and call your doctor if you are having problems. It's also a good idea to know your test results and keep a list of the medicines you take. How can you care for yourself at home? · Rest until you feel better. · To prevent dehydration, drink plenty of fluids, enough so that your urine is light yellow or clear like water. Choose water and other caffeine-free clear liquids until you feel better.  If you have kidney, heart, or liver disease and have to limit fluids, talk with your doctor before you increase the amount of fluids you drink. · If your stomach is upset, eat mild foods, such as rice, dry toast or crackers, bananas, and applesauce. Try eating several small meals instead of two or three large ones. · Wait until 48 hours after all symptoms have gone away before you have spicy foods, alcohol, and drinks that contain caffeine. · Do not eat foods that are high in fat. · Avoid anti-inflammatory medicines such as aspirin, ibuprofen (Advil, Motrin), and naproxen (Aleve). These can cause stomach upset. Talk to your doctor if you take daily aspirin for another health problem. When should you call for help? Call 911 anytime you think you may need emergency care. For example, call if:    · You passed out (lost consciousness).     · You pass maroon or very bloody stools.     · You vomit blood or what looks like coffee grounds.     · You have new, severe belly pain. Call your doctor now or seek immediate medical care if:    · Your pain gets worse, especially if it becomes focused in one area of your belly.     · You have a new or higher fever.     · Your stools are black and look like tar, or they have streaks of blood.     · You have unexpected vaginal bleeding.     · You have symptoms of a urinary tract infection. These may include:  ? Pain when you urinate. ? Urinating more often than usual.  ? Blood in your urine.     · You are dizzy or lightheaded, or you feel like you may faint. Watch closely for changes in your health, and be sure to contact your doctor if:    · You are not getting better after 1 day (24 hours). Where can you learn more? Go to http://www.gray.com/  Enter U740 in the search box to learn more about \"Abdominal Pain: Care Instructions. \"  Current as of: June 26, 2019               Content Version: 12.6  © 7763-3047 [employer].    Care instructions adapted under license by [employer] (which disclaims liability or warranty for this information). If you have questions about a medical condition or this instruction, always ask your healthcare professional. [address] any warranty or liability for your use of this information.

## 2020-10-04 NOTE — ED TRIAGE NOTES
Patient c/o right upper quadrant abdominal pain since last week. She states that she is scheduled for camera guided endoscopy on October 14th. She states that she has acid refulx and has had cholecystectomy.

## 2020-10-04 NOTE — ED PROVIDER NOTES
60-year-old female chronic epigastric pain now with 3-day history of right upper quadrant pain. Patient is status post cholecystectomy, appendectomy, and hysterectomy. Patient is currently maintained on PPI, H2 blocker, and sulcal fate for treatment of reflux. She is scheduled to undergo endoscopy later this month. Over the last 3 days she notes recurrent pain in the right upper quadrant. Not worse with inspiration. No associated cough. Not worse with food. No vomiting or diarrhea. Pain is in a different location than that typically associated with her reflux symptoms. Past Medical History:   Diagnosis Date    Acid reflux     Anemia     HTN (hypertension)        Past Surgical History:   Procedure Laterality Date    HX CHOLECYSTECTOMY      HX TUBAL LIGATION           History reviewed. No pertinent family history.     Social History     Socioeconomic History    Marital status: SINGLE     Spouse name: Not on file    Number of children: Not on file    Years of education: Not on file    Highest education level: Not on file   Occupational History    Not on file   Social Needs    Financial resource strain: Not on file    Food insecurity     Worry: Not on file     Inability: Not on file    Transportation needs     Medical: Not on file     Non-medical: Not on file   Tobacco Use    Smoking status: Never Smoker   Substance and Sexual Activity    Alcohol use: Yes     Comment: occasional    Drug use: No    Sexual activity: Not on file   Lifestyle    Physical activity     Days per week: Not on file     Minutes per session: Not on file    Stress: Not on file   Relationships    Social connections     Talks on phone: Not on file     Gets together: Not on file     Attends Religion service: Not on file     Active member of club or organization: Not on file     Attends meetings of clubs or organizations: Not on file     Relationship status: Not on file    Intimate partner violence     Fear of current or ex partner: Not on file     Emotionally abused: Not on file     Physically abused: Not on file     Forced sexual activity: Not on file   Other Topics Concern    Not on file   Social History Narrative    Not on file         ALLERGIES: Lisinopril    Review of Systems   Constitutional: Negative for fever. Respiratory: Negative for cough and shortness of breath. Cardiovascular: Negative for chest pain. Gastrointestinal: Positive for abdominal pain. Negative for diarrhea, nausea and vomiting. All other systems reviewed and are negative. Vitals:    10/04/20 0949   BP: (!) 152/86   Pulse: 74   Resp: 18   Temp: 97.9 °F (36.6 °C)   SpO2: 99%   Weight: 78 kg (172 lb)   Height: 5' 5\" (1.651 m)            Physical Exam  Vitals signs and nursing note reviewed. Constitutional:       General: She is not in acute distress. Appearance: She is well-developed. She is not diaphoretic. HENT:      Head: Normocephalic and atraumatic. Nose: Nose normal.   Eyes:      General: No scleral icterus. Right eye: No discharge. Left eye: No discharge. Neck:      Musculoskeletal: Neck supple. Vascular: No JVD. Cardiovascular:      Rate and Rhythm: Normal rate and regular rhythm. Heart sounds: Normal heart sounds. No murmur. No friction rub. No gallop. Pulmonary:      Effort: Pulmonary effort is normal. No respiratory distress. Breath sounds: Normal breath sounds. No wheezing or rales. Abdominal:      Palpations: Abdomen is soft. Tenderness: There is no guarding or rebound. Comments: Mild tenderness in the right upper quadrant. Negative Sutherland sign. No rebound or guarding. Musculoskeletal:         General: No tenderness. Skin:     General: Skin is warm and dry. Findings: No rash. Neurological:      Mental Status: She is alert and oriented to person, place, and time. Motor: No abnormal muscle tone.       Coordination: Coordination normal. Recent Results (from the past 12 hour(s))   CBC WITH AUTOMATED DIFF    Collection Time: 10/04/20 11:40 AM   Result Value Ref Range    WBC 5.4 4.6 - 13.2 K/uL    RBC 4.94 4.20 - 5.30 M/uL    HGB 15.1 12.0 - 16.0 g/dL    HCT 45.0 35.0 - 45.0 %    MCV 91.1 74.0 - 97.0 FL    MCH 30.6 24.0 - 34.0 PG    MCHC 33.6 31.0 - 37.0 g/dL    RDW 13.1 11.6 - 14.5 %    PLATELET 749 167 - 380 K/uL    MPV 12.0 (H) 9.2 - 11.8 FL    NEUTROPHILS 66 40 - 73 %    LYMPHOCYTES 24 21 - 52 %    MONOCYTES 7 3 - 10 %    EOSINOPHILS 2 0 - 5 %    BASOPHILS 1 0 - 2 %    ABS. NEUTROPHILS 3.6 1.8 - 8.0 K/UL    ABS. LYMPHOCYTES 1.3 0.9 - 3.6 K/UL    ABS. MONOCYTES 0.4 0.05 - 1.2 K/UL    ABS. EOSINOPHILS 0.1 0.0 - 0.4 K/UL    ABS. BASOPHILS 0.0 0.0 - 0.1 K/UL    DF AUTOMATED     METABOLIC PANEL, COMPREHENSIVE    Collection Time: 10/04/20 11:40 AM   Result Value Ref Range    Sodium 139 136 - 145 mmol/L    Potassium 3.3 (L) 3.5 - 5.5 mmol/L    Chloride 104 100 - 111 mmol/L    CO2 27 21 - 32 mmol/L    Anion gap 8 3.0 - 18 mmol/L    Glucose 94 74 - 99 mg/dL    BUN 10 7.0 - 18 MG/DL    Creatinine 0.89 0.6 - 1.3 MG/DL    BUN/Creatinine ratio 11 (L) 12 - 20      GFR est AA >60 >60 ml/min/1.73m2    GFR est non-AA >60 >60 ml/min/1.73m2    Calcium 9.3 8.5 - 10.1 MG/DL    Bilirubin, total 0.7 0.2 - 1.0 MG/DL    ALT (SGPT) 32 13 - 56 U/L    AST (SGOT) 23 10 - 38 U/L    Alk. phosphatase 79 45 - 117 U/L    Protein, total 8.3 (H) 6.4 - 8.2 g/dL    Albumin 4.0 3.4 - 5.0 g/dL    Globulin 4.3 (H) 2.0 - 4.0 g/dL    A-G Ratio 0.9 0.8 - 1.7     LIPASE    Collection Time: 10/04/20 11:40 AM   Result Value Ref Range    Lipase 66 (L) 73 - 393 U/L     CT ABD PELV W CONT   Final Result   IMPRESSION: .      No CT evidence for acute process in the abdomen or pelvis. Heterogeneous probable leiomyomatous uterus. Endometrial stripe appears   prominent for patient age. Nonemergent follow-up outpatient ultrasound may be   helpful for further evaluation. MDM  Number of Diagnoses or Management Options  Abdominal pain, RUQ (right upper quadrant):   Enlarged uterus:   Diagnosis management comments: Impression: Right upper quadrant pain. Patient status post cholecystectomy. Screening CT scan negative for diverticulitis. Normal lipase and labs. Location different than that associated with her reflux symptoms. She is on maximum medication for same. Appear stable to follow-up with gastroenterology for scheduled endoscopy later this month. Incidental finding of abnormal endometrial stripe within the uterus noted and results discussed with patient. Procedures      Diagnosis:   1. Abdominal pain, RUQ (right upper quadrant)    2. Enlarged uterus      1. Routine screening labs all within normal limits. 2.  CT scan does not reveal any focal source for acute pain. 3.  Incidental note made of enlarged uterus on CT scanning. Recommend GYN follow-up for evaluation of same. Findings consistent with uterine fibroid. 4.  Follow-up with your gastroenterologist for endoscopy as currently scheduled. 5.  Continue current medications. 6.  [place] diet. 7.  Return to the emergency department for severe pain with fever, repetitive vomiting, or other acutely worsening symptoms. 8.  Recheck with PCP in 3 to 5 days if not improving.     Disposition: home    Follow-up Information     Follow up With Specialties Details Why Contact Info    Lidia Londono,  Family Medicine Schedule an appointment as soon as possible for a visit in 5 days for recheck of ongoing symptoms [address] [facility]  [phone]      [address] [facility] ([place], [place]) EMERGENCY DEPT Emergency Medicine  As needed, If symptoms worsen [address] [facility]  [phone]    [facility], [address] [facility] Gynecology  for evaluation of uterine enlargement [address] [facility], [address]  [phone]          Patient's Medications   Start Taking    No medications on file   Continue Taking    AMLODIPINE (NORVASC) 10 MG TABLET    Take 10 mg by mouth daily. ATORVASTATIN (LIPITOR) 10 MG TABLET    Take 10 mg by mouth daily. FAMOTIDINE (PEPCID) 20 MG TABLET    Take 20 mg by mouth daily. IBUPROFEN (MOTRIN PO)    Take 800 mg by mouth as needed. LOSARTAN (COZAAR) 50 MG TABLET    Take one po daily for blood pressure    POLYETHYLENE GLYCOL (MIRALAX) 17 GRAM/DOSE POWDER    Take 17 g by mouth daily. 1 tablespoon with 8 oz of water daily    POTASSIUM CITRATE (UROCIT-K10) 10 MEQ (1,080 MG) TBER    Take 10 mEq by mouth daily. RABEPRAZOLE (ACIPHEX) 20 MG TABLET    Take 1 Tab by mouth daily. SUCRALFATE PO    Take 10 mL by mouth Before breakfast, lunch, and dinner. These Medications have changed    No medications on file   Stop Taking    MELOXICAM (MOBIC) 7.5 MG TABLET    Take one tab every 12 hours (with food), as needed for pain; use lowest effective dose, for the shortest effective treatment duration    SPIRONOLACTONE (ALDACTONE) 25 MG TABLET    Take 25 mg by mouth daily.

## 2021-03-07 ENCOUNTER — HOSPITAL ENCOUNTER (EMERGENCY)
Age: 56
Discharge: HOME | End: 2021-03-07
Attending: EMERGENCY MEDICINE
Payer: MEDICAID

## 2021-03-07 ENCOUNTER — APPOINTMENT (OUTPATIENT)
Dept: GENERAL RADIOLOGY | Age: 56
End: 2021-03-07
Attending: NURSE PRACTITIONER
Payer: MEDICAID

## 2021-03-07 ENCOUNTER — APPOINTMENT (OUTPATIENT)
Dept: CT IMAGING | Age: 56
End: 2021-03-07
Attending: NURSE PRACTITIONER
Payer: MEDICAID

## 2021-03-07 VITALS
RESPIRATION RATE: 18 BRPM | HEIGHT: 65 IN | OXYGEN SATURATION: 100 % | HEART RATE: 73 BPM | SYSTOLIC BLOOD PRESSURE: 148 MMHG | DIASTOLIC BLOOD PRESSURE: 91 MMHG | TEMPERATURE: 98.5 F | BODY MASS INDEX: 26.16 KG/M2 | WEIGHT: 157 LBS

## 2021-03-07 DIAGNOSIS — N30.91 CYSTITIS WITH HEMATURIA: Primary | ICD-10-CM

## 2021-03-07 DIAGNOSIS — K59.00 CONSTIPATION, UNSPECIFIED CONSTIPATION TYPE: ICD-10-CM

## 2021-03-07 DIAGNOSIS — D25.9 UTERINE LEIOMYOMA, UNSPECIFIED LOCATION: ICD-10-CM

## 2021-03-07 DIAGNOSIS — R07.9 CHEST PAIN, UNSPECIFIED TYPE: ICD-10-CM

## 2021-03-07 LAB
ALBUMIN SERPL-MCNC: 3.8 G/DL (ref 3.4–5)
ALBUMIN/GLOB SERPL: 0.8 {RATIO} (ref 0.8–1.7)
ALP SERPL-CCNC: 85 U/L (ref 45–117)
ALT SERPL-CCNC: 72 U/L (ref 13–56)
ANION GAP SERPL CALC-SCNC: 6 MMOL/L (ref 3–18)
APPEARANCE UR: CLEAR
AST SERPL-CCNC: 47 U/L (ref 10–38)
BACTERIA URNS QL MICRO: ABNORMAL /HPF
BASOPHILS # BLD: 0 K/UL (ref 0–0.1)
BASOPHILS NFR BLD: 0 % (ref 0–2)
BILIRUB SERPL-MCNC: 0.5 MG/DL (ref 0.2–1)
BILIRUB UR QL: NEGATIVE
BUN SERPL-MCNC: 17 MG/DL (ref 7–18)
BUN/CREAT SERPL: 18 (ref 12–20)
CALCIUM SERPL-MCNC: 9.3 MG/DL (ref 8.5–10.1)
CHLORIDE SERPL-SCNC: 107 MMOL/L (ref 100–111)
CO2 SERPL-SCNC: 30 MMOL/L (ref 21–32)
COLOR UR: YELLOW
CREAT SERPL-MCNC: 0.93 MG/DL (ref 0.6–1.3)
DIFFERENTIAL METHOD BLD: ABNORMAL
EOSINOPHIL # BLD: 0 K/UL (ref 0–0.4)
EOSINOPHIL NFR BLD: 1 % (ref 0–5)
EPITH CASTS URNS QL MICRO: ABNORMAL /LPF (ref 0–5)
ERYTHROCYTE [DISTWIDTH] IN BLOOD BY AUTOMATED COUNT: 13 % (ref 11.6–14.5)
GLOBULIN SER CALC-MCNC: 4.9 G/DL (ref 2–4)
GLUCOSE SERPL-MCNC: 100 MG/DL (ref 74–99)
GLUCOSE UR STRIP.AUTO-MCNC: NEGATIVE MG/DL
HCT VFR BLD AUTO: 45.3 % (ref 35–45)
HGB BLD-MCNC: 15 G/DL (ref 12–16)
HGB UR QL STRIP: ABNORMAL
KETONES UR QL STRIP.AUTO: NEGATIVE MG/DL
LEUKOCYTE ESTERASE UR QL STRIP.AUTO: ABNORMAL
LIPASE SERPL-CCNC: 85 U/L (ref 73–393)
LYMPHOCYTES # BLD: 1.5 K/UL (ref 0.9–3.6)
LYMPHOCYTES NFR BLD: 26 % (ref 21–52)
MCH RBC QN AUTO: 31 PG (ref 24–34)
MCHC RBC AUTO-ENTMCNC: 33.1 G/DL (ref 31–37)
MCV RBC AUTO: 93.6 FL (ref 74–97)
MONOCYTES # BLD: 0.5 K/UL (ref 0.05–1.2)
MONOCYTES NFR BLD: 8 % (ref 3–10)
MUCOUS THREADS URNS QL MICRO: ABNORMAL /LPF
NEUTS SEG # BLD: 3.9 K/UL (ref 1.8–8)
NEUTS SEG NFR BLD: 65 % (ref 40–73)
NITRITE UR QL STRIP.AUTO: NEGATIVE
PH UR STRIP: 6 [PH] (ref 5–8)
PLATELET # BLD AUTO: 202 K/UL (ref 135–420)
PMV BLD AUTO: 12.2 FL (ref 9.2–11.8)
POTASSIUM SERPL-SCNC: 3.7 MMOL/L (ref 3.5–5.5)
PROT SERPL-MCNC: 8.7 G/DL (ref 6.4–8.2)
PROT UR STRIP-MCNC: NEGATIVE MG/DL
RBC # BLD AUTO: 4.84 M/UL (ref 4.2–5.3)
RBC #/AREA URNS HPF: ABNORMAL /HPF (ref 0–5)
SODIUM SERPL-SCNC: 143 MMOL/L (ref 136–145)
SP GR UR REFRACTOMETRY: 1.03 (ref 1–1.03)
TROPONIN I SERPL-MCNC: <0.02 NG/ML (ref 0–0.04)
UROBILINOGEN UR QL STRIP.AUTO: 1 EU/DL (ref 0.2–1)
WBC # BLD AUTO: 5.9 K/UL (ref 4.6–13.2)
WBC URNS QL MICRO: ABNORMAL /HPF (ref 0–4)

## 2021-03-07 PROCEDURE — 87086 URINE CULTURE/COLONY COUNT: CPT

## 2021-03-07 PROCEDURE — 74177 CT ABD & PELVIS W/CONTRAST: CPT

## 2021-03-07 PROCEDURE — 93005 ELECTROCARDIOGRAM TRACING: CPT

## 2021-03-07 PROCEDURE — 84484 ASSAY OF TROPONIN QUANT: CPT

## 2021-03-07 PROCEDURE — 81001 URINALYSIS AUTO W/SCOPE: CPT

## 2021-03-07 PROCEDURE — 85025 COMPLETE CBC W/AUTO DIFF WBC: CPT

## 2021-03-07 PROCEDURE — 74011000636 HC RX REV CODE- 636: Performed by: EMERGENCY MEDICINE

## 2021-03-07 PROCEDURE — 83690 ASSAY OF LIPASE: CPT

## 2021-03-07 PROCEDURE — 80053 COMPREHEN METABOLIC PANEL: CPT

## 2021-03-07 PROCEDURE — 87147 CULTURE TYPE IMMUNOLOGIC: CPT

## 2021-03-07 PROCEDURE — 99284 EMERGENCY DEPT VISIT MOD MDM: CPT

## 2021-03-07 PROCEDURE — 71045 X-RAY EXAM CHEST 1 VIEW: CPT

## 2021-03-07 RX ORDER — POLYETHYLENE GLYCOL 3350 17 G/17G
POWDER, FOR SOLUTION ORAL
Qty: 595 G | Refills: 0 | Status: SHIPPED | OUTPATIENT
Start: 2021-03-07

## 2021-03-07 RX ORDER — BUSPIRONE HYDROCHLORIDE 5 MG/1
5 TABLET ORAL
COMMUNITY

## 2021-03-07 RX ORDER — GLYCERIN ADULT
1 SUPPOSITORY, RECTAL RECTAL
Qty: 1 SUPPOSITORY | Refills: 0 | Status: SHIPPED | OUTPATIENT
Start: 2021-03-07

## 2021-03-07 RX ORDER — LORATADINE 10 MG/1
10 TABLET ORAL
COMMUNITY

## 2021-03-07 RX ORDER — NITROFURANTOIN 25; 75 MG/1; MG/1
100 CAPSULE ORAL 2 TIMES DAILY
Qty: 14 CAP | Refills: 0 | Status: SHIPPED | OUTPATIENT
Start: 2021-03-07 | End: 2021-03-14

## 2021-03-07 RX ADMIN — IOPAMIDOL 100 ML: 612 INJECTION, SOLUTION INTRAVENOUS at 16:16

## 2021-03-07 NOTE — ED NOTES
I have reviewed discharge instructions with the patient. The patient verbalized understanding. Current Discharge Medication List      START taking these medications    Details   glycerin, adult, suppository Insert 1 Suppository into rectum daily as needed for Other (constipation) for up to 10 doses. Qty: 1 Suppository, Refills: 0      nitrofurantoin, macrocrystal-monohydrate, (Macrobid) 100 mg capsule Take 1 Cap by mouth two (2) times a day for 7 days.   Qty: 14 Cap, Refills: 0         CONTINUE these medications which have CHANGED    Details   polyethylene glycol (Miralax) 17 gram/dose powder Take 17 g p.o. twice daily for 7 days, then 17 g p.o. daily for 7 days, then 17 g p.o. daily as needed constipation  Qty: 595 g, Refills: 0

## 2021-03-07 NOTE — ED PROVIDER NOTES
EMERGENCY DEPARTMENT HISTORY AND PHYSICAL EXAM    Date: 3/7/2021  Patient Name: Amelia Farley    History of Presenting Illness     Chief Complaint   Patient presents with    Abdominal Pain    Chest Pain    Constipation         History Provided By: Patient    Additional History (Context): Amelia Farley is a 48 y.o. female with past medical history significant for acid reflux, anemia, anxiety, colon polyps, diverticular disease, globus pharyngitis, and hypertension who presents with complaints of right upper chest pain for the last 3 weeks. She states the chest pain comes and goes and is worse with use of the right upper extremity. She states the power steering in her car has gone out and she thinks that she may have pulled a muscle in her chest.  She denies any associated nausea, vomiting, diaphoresis, lightheadedness, or shortness of breath. She has no other respiratory complaints such as cough. Additionally, she states she has been having upper abdominal pain in the epigastric and left upper quadrant for the last 2 weeks and onset of constipation for the last week. She denies any changes to her diet and has been taking her reflux medication as prescribed. She denies any tobacco or alcohol use. She denies nausea, vomiting, diarrhea, black tarry stool, blood in the stool, or unexplained weight loss. Patient had a colonoscopy and was found to have polyps about 4 weeks ago. She also had an endoscopy the end of last year and was told that was normal.  She is status post cholecystectomy in 2017 and a bilateral tubal ligation 26 years ago. She states she had a hard, small bowel movement today without blood or tarry stool but states she did have to strain. PCP: Bobby Plunkett, DO    Current Outpatient Medications   Medication Sig Dispense Refill    loratadine (Claritin) 10 mg tablet Take 10 mg by mouth daily as needed for Allergies.       busPIRone (BUSPAR) 5 mg tablet Take 5 mg by mouth two (2) times daily as needed.  glycerin, adult, suppository Insert 1 Suppository into rectum daily as needed for Other (constipation) for up to 10 doses. 1 Suppository 0    polyethylene glycol (Miralax) 17 gram/dose powder Take 17 g p.o. twice daily for 7 days, then 17 g p.o. daily for 7 days, then 17 g p.o. daily as needed constipation 595 g 0    nitrofurantoin, macrocrystal-monohydrate, (Macrobid) 100 mg capsule Take 1 Cap by mouth two (2) times a day for 7 days. 14 Cap 0    potassium citrate (UROCIT-K10) 10 mEq (1,080 mg) TbER Take 10 mEq by mouth daily.  atorvastatin (Lipitor) 10 mg tablet Take 10 mg by mouth daily.  losartan (COZAAR) 50 mg tablet Take one po daily for blood pressure      RABEprazole (Aciphex) 20 mg tablet Take 1 Tab by mouth daily. 20 Tab 0    amLODIPine (NORVASC) 10 mg tablet Take 10 mg by mouth daily. Past History     Past Medical History:  Past Medical History:   Diagnosis Date    Abdominal pain     Acid reflux     Anemia     Anxiety     Colon polyp     Diverticular disease     External hemorrhoids     GERD (gastroesophageal reflux disease)     Globus pharyngeus     HTN (hypertension)     Mixed anxiety and depressive disorder        Past Surgical History:  Past Surgical History:   Procedure Laterality Date    HX CHOLECYSTECTOMY      HX TUBAL LIGATION         Family History:  History reviewed. No pertinent family history. Social History:  Social History     Tobacco Use    Smoking status: Never Smoker   Substance Use Topics    Alcohol use: Yes     Comment: occasional    Drug use: No       Allergies: Allergies   Allergen Reactions    Lisinopril Cough         Review of Systems     Review of Systems   Constitutional: Negative for chills and fever. HENT: Negative for nasal congestion, sore throat, rhinorrhea  Eyes: Negative. Respiratory: Negative for cough and negative for shortness of breath. Cardiovascular: Positive for chest pain.   Negative for palpitations. Gastrointestinal: Positive for abdominal pain and constipation. Negative for diarrhea, nausea and vomiting. Genitourinary: Negative. Negative for difficulty urinating and flank pain. Musculoskeletal: Negative for back pain. Negative for gait problem and neck pain. Skin: Negative. Allergic/Immunologic: Negative. Neurological: Negative for dizziness, weakness, numbness and headaches. Psychiatric/Behavioral: Negative. All other systems reviewed and are negative. All Other Systems Negative  Physical Exam     Vitals:    03/07/21 1438 03/07/21 1445   BP: (!) 148/91    Pulse: 73    Resp: 18    Temp:  98.5 °F (36.9 °C)   SpO2: 100%    Weight:  71.2 kg (157 lb)   Height:  5' 5\" (1.651 m)     Physical Exam  Vitals signs and nursing note reviewed. Constitutional:       General: She is not in acute distress. Appearance: She is well-developed and normal weight. She is not ill-appearing, toxic-appearing or diaphoretic. HENT:      Head: Normocephalic and atraumatic. Nose: Nose normal. No congestion or rhinorrhea. Mouth/Throat:      Mouth: Mucous membranes are moist.      Pharynx: Oropharynx is clear. Eyes:      Extraocular Movements: Extraocular movements intact. Pupils: Pupils are equal, round, and reactive to light. Neck:      Musculoskeletal: Normal range of motion and neck supple. Thyroid: No thyromegaly. Cardiovascular:      Rate and Rhythm: Normal rate and regular rhythm. Pulses: Normal pulses. Heart sounds: Normal heart sounds. No murmur. No friction rub. No gallop. Pulmonary:      Effort: Pulmonary effort is normal. No respiratory distress. Breath sounds: Normal breath sounds. No stridor. No wheezing, rhonchi or rales. Chest:      Chest wall: No tenderness. Abdominal:      General: Abdomen is flat. Bowel sounds are normal. There is no distension. There are no signs of injury. Palpations: Abdomen is soft.  There is no hepatomegaly, splenomegaly or mass. Tenderness: There is abdominal tenderness in the epigastric area and left upper quadrant. There is no right CVA tenderness, left CVA tenderness, guarding or rebound. Negative signs include Sutherland's sign, Rovsing's sign, McBurney's sign and psoas sign. Hernia: No hernia is present. Lymphadenopathy:      Cervical: No cervical adenopathy. Skin:     General: Skin is dry. Capillary Refill: Capillary refill takes less than 2 seconds. Neurological:      General: No focal deficit present. Mental Status: She is alert and oriented to person, place, and time.    Psychiatric:         Mood and Affect: Mood normal.         Behavior: Behavior normal.           Diagnostic Study Results     Labs -     Recent Results (from the past 12 hour(s))   EKG, 12 LEAD, INITIAL    Collection Time: 03/07/21  2:33 PM   Result Value Ref Range    Ventricular Rate 78 BPM    Atrial Rate 78 BPM    P-R Interval 184 ms    QRS Duration 100 ms    Q-T Interval 384 ms    QTC Calculation (Bezet) 437 ms    Calculated P Axis 79 degrees    Calculated R Axis 47 degrees    Calculated T Axis 83 degrees    Diagnosis       Normal sinus rhythm  Normal ECG  When compared with ECG of 21-MAY-2020 10:49,  Incomplete right bundle branch block is no longer present  Criteria for Septal infarct are no longer present     URINALYSIS W/ RFLX MICROSCOPIC    Collection Time: 03/07/21  2:52 PM   Result Value Ref Range    Color YELLOW      Appearance CLEAR      Specific gravity 1.028 1.005 - 1.030      pH (UA) 6.0 5.0 - 8.0      Protein Negative NEG mg/dL    Glucose Negative NEG mg/dL    Ketone Negative NEG mg/dL    Bilirubin Negative NEG      Blood TRACE (A) NEG      Urobilinogen 1.0 0.2 - 1.0 EU/dL    Nitrites Negative NEG      Leukocyte Esterase MODERATE (A) NEG     URINE MICROSCOPIC ONLY    Collection Time: 03/07/21  2:52 PM   Result Value Ref Range    WBC 11 to 20 0 - 4 /hpf    RBC 0 to 3 0 - 5 /hpf    Epithelial cells 2+ 0 - 5 /lpf    Bacteria 1+ (A) NEG /hpf    Mucus 1+ (A) NEG /lpf   CBC WITH AUTOMATED DIFF    Collection Time: 03/07/21  3:33 PM   Result Value Ref Range    WBC 5.9 4.6 - 13.2 K/uL    RBC 4.84 4.20 - 5.30 M/uL    HGB 15.0 12.0 - 16.0 g/dL    HCT 45.3 (H) 35.0 - 45.0 %    MCV 93.6 74.0 - 97.0 FL    MCH 31.0 24.0 - 34.0 PG    MCHC 33.1 31.0 - 37.0 g/dL    RDW 13.0 11.6 - 14.5 %    PLATELET 538 808 - 397 K/uL    MPV 12.2 (H) 9.2 - 11.8 FL    NEUTROPHILS 65 40 - 73 %    LYMPHOCYTES 26 21 - 52 %    MONOCYTES 8 3 - 10 %    EOSINOPHILS 1 0 - 5 %    BASOPHILS 0 0 - 2 %    ABS. NEUTROPHILS 3.9 1.8 - 8.0 K/UL    ABS. LYMPHOCYTES 1.5 0.9 - 3.6 K/UL    ABS. MONOCYTES 0.5 0.05 - 1.2 K/UL    ABS. EOSINOPHILS 0.0 0.0 - 0.4 K/UL    ABS. BASOPHILS 0.0 0.0 - 0.1 K/UL    DF AUTOMATED     METABOLIC PANEL, COMPREHENSIVE    Collection Time: 03/07/21  3:33 PM   Result Value Ref Range    Sodium 143 136 - 145 mmol/L    Potassium 3.7 3.5 - 5.5 mmol/L    Chloride 107 100 - 111 mmol/L    CO2 30 21 - 32 mmol/L    Anion gap 6 3.0 - 18 mmol/L    Glucose 100 (H) 74 - 99 mg/dL    BUN 17 7.0 - 18 MG/DL    Creatinine 0.93 0.6 - 1.3 MG/DL    BUN/Creatinine ratio 18 12 - 20      GFR est AA >60 >60 ml/min/1.73m2    GFR est non-AA >60 >60 ml/min/1.73m2    Calcium 9.3 8.5 - 10.1 MG/DL    Bilirubin, total 0.5 0.2 - 1.0 MG/DL    ALT (SGPT) 72 (H) 13 - 56 U/L    AST (SGOT) 47 (H) 10 - 38 U/L    Alk. phosphatase 85 45 - 117 U/L    Protein, total 8.7 (H) 6.4 - 8.2 g/dL    Albumin 3.8 3.4 - 5.0 g/dL    Globulin 4.9 (H) 2.0 - 4.0 g/dL    A-G Ratio 0.8 0.8 - 1.7     TROPONIN I    Collection Time: 03/07/21  3:33 PM   Result Value Ref Range    Troponin-I, QT <0.02 0.0 - 0.045 NG/ML   LIPASE    Collection Time: 03/07/21  3:33 PM   Result Value Ref Range    Lipase 85 73 - 393 U/L       Radiologic Studies -   CT ABD PELV W CONT   Final Result      Constipation. Diverticulosis coli. Enlarged fibroid uterus. Trace quantity free fluid pelvis. Atelectasis at lung bases. XR CHEST PORT   Final Result      NORMAL CHEST. CT Results  (Last 48 hours)               03/07/21 1614  CT ABD PELV W CONT Final result    Impression:      Constipation. Diverticulosis coli. Enlarged fibroid uterus. Trace quantity free fluid pelvis. Atelectasis at lung bases. Narrative:  CT of the Abdomen and Pelvis With Contrast       CPT CODE: 52167       CLINICAL HISTORY: Right-sided chest wall pain intermittently for 2 to 3 weeks. No injury. Bilateral upper carotid pain. .       TECHNIQUE: After uneventful administration of nonionic intravenous contrast (   100 cc Isovue 796), 5 mm helical scan was obtained of the abdomen and pelvis. Sagittal and coronal reformations then created with the original data set. All   CT scans at this facility are performed using dose optimization techniques as   appropriate to a performed exam, to include automated exposure control,   adjustment of the mA and/or kV according to patient's size (including   appropriate matching for site specific examinations), or use of iterative   reconstruction technique. COMPARISON: 10/4/2020         FINDINGS:        CT Abdomen and pelvis:               Lung bases: [Subsegmental linear densities both lung bases. No effusions. ]       Heart and pericardium: [ Normal. ]        Liver: [ Normal.  No focal lesions. ]       Gallbladder/biliary: Prior cholecystectomy. Spleen: [ Normal. ]       Pancreas: Normal enhancement. No duct dilatation. Adrenals: Normal.        Kidneys: Normally enhancing. No focal lesions. No hydronephrosis or   nephrolithiasis. Retroperitoneum: Great vessels normal.        Lymph nodes: No adenopathy upper abdomen, mesentery, retroperitoneum, pelvis,   groins. Bowel: Stomach, small bowel, appendix are normal. Moderate quantity fecal   material at right colon and transverse colon. Few scattered diverticula.  No   inflamed diverticula or associated bowel wall thickening. Peritoneal space: Trace free fluid in the deep pelvis. :  Uterus is enlarged, 10.0 x 4.9 x 8.3 cm. There are multiple myometrial   masses, largest on the right with punctate calcifications as before measuring   5.7 x 5.7 cm. No adnexal masses. The ovaries are normal.       Abdominal wall/MSK: No hernias. No acute abnormalities at skeleton. CXR Results  (Last 48 hours)               03/07/21 1539  XR CHEST PORT Final result    Impression:      NORMAL CHEST. Narrative:  Portable Frontal Chest.       CLINICAL HISTORY: Intermittent right chest wall pain for 2 to 3 weeks. No   injury. Also bilateral upper quadrant pain for 2 weeks. TECHNIQUE: Single frontal view of the chest, obtained portably. COMPARISONS: 5/21/2020. FINDINGS:          The lungs are clear. The cardiomediastinal silhouette is unremarkable. Pulmonary vascularity is normal.  There are no effusions. No free air under the   diaphragms. Ribs appear intact. Medical Decision Making   I am the first provider for this patient. I reviewed the vital signs, available nursing notes, past medical history, past surgical history, family history and social history. Vital Signs-Reviewed the patient's vital signs. Records Reviewed: Nursing Notes, Old Medical Records, Previous electrocardiograms, Previous Radiology Studies and Previous Laboratory Studies    Procedures:  Procedures    ED Course: Progress Notes, Reevaluation, and Consults:  2:52 PM  Initial assessment performed. The patients presenting problems have been discussed, and they/their family are in agreement with the care plan formulated and outlined with them. I have encouraged them to ask questions as they arise throughout their visit. 5:59 PM CT scan shows diverticulosis, constipation, and a fibroid uterus. Labs are unremarkable and UTI was noted on urinalysis. Patient will be discharged home with glycerin suppositories and MiraLAX with close follow-up with GI. She will also be given Macrobid for the urinary tract infection. Urine culture is pending. Patient reassessed and pain has improved, not worsened and has no new complaints. Labs within normal limits. Work-up here is otherwise unremarkable. Supportive treatment, PCP follow-up, and return criteria discussed. Provider Notes (Medical Decision Making):       Number of Diagnoses or Management Options  Diagnosis management comments: DIFFERENTIAL DIAGNOSES: Chest pain etiologies include acute cardiac events to include possible acute myocardial infarction, acute coronary syndrome, pneumonia, chest wall pain (myofascial/ musculoskeletal etiology), chronic obstructive pulmonary disease (copd), acute asthma exacerbation, congestive heart failure, acute bronchitis, pulmonary embolism, upper respiratory infection, referred abdominal pain, other etiologies, versus combination of the above. I have considered several life-threatening etiologies for this patient's chest pain, such as TAD and ACS, and this patient's presentation is not consistent with such entities and therefore, no further testing was warranted. Patient presents ambulatory in no acute distress, well-hydrated, non-toxic in appearance, and afebrile with normal vitals. EKG normal sinus rhythm rate 88 bpm without ectopy or repolarization abnormalities. .  No history of cardiac disease, no family history of cardiac disease, no pleuritic or exertional symptoms, no associated diaphoresis or n/v. Do not suspect PE, ACS, or aortic pathology. No ST elevation or depression. Troponin was within normal limits. Chest x-ray appears unremarkable. HEART score is 1, indicating low risk. Well's score 0, indicating low risk for PE. Patient has no exertional chest pain or radiating pain. Discussed proper way to take medications.  Discussed treatment plan, return precautions, symptomatic relief, and expected time to improvement. All questions answered. Patient is stable for discharge and outpatient management. MED RECONCILIATION:  No current facility-administered medications for this encounter. Current Outpatient Medications   Medication Sig    loratadine (Claritin) 10 mg tablet Take 10 mg by mouth daily as needed for Allergies.  busPIRone (BUSPAR) 5 mg tablet Take 5 mg by mouth two (2) times daily as needed.  glycerin, adult, suppository Insert 1 Suppository into rectum daily as needed for Other (constipation) for up to 10 doses.  polyethylene glycol (Miralax) 17 gram/dose powder Take 17 g p.o. twice daily for 7 days, then 17 g p.o. daily for 7 days, then 17 g p.o. daily as needed constipation    nitrofurantoin, macrocrystal-monohydrate, (Macrobid) 100 mg capsule Take 1 Cap by mouth two (2) times a day for 7 days.  potassium citrate (UROCIT-K10) 10 mEq (1,080 mg) TbER Take 10 mEq by mouth daily.  atorvastatin (Lipitor) 10 mg tablet Take 10 mg by mouth daily.  losartan (COZAAR) 50 mg tablet Take one po daily for blood pressure    RABEprazole (Aciphex) 20 mg tablet Take 1 Tab by mouth daily.  amLODIPine (NORVASC) 10 mg tablet Take 10 mg by mouth daily. Disposition:  home    DISCHARGE NOTE:     Patient has been reexamined. Patient has no new complaints, changes, or physical findings. Care plan outlined and precautions discussed. Discussed proper way to take medications. Discussed treatment plan, return precautions, symptomatic relief, and expected time to improvement. All questions answered. Patient is stable for discharge and outpatient management. Patient is ready to go home.     Follow-up Information     Follow up With Specialties Details Why Contact Reji Salazar DO Family Medicine Schedule an appointment as soon as possible for a visit  Follow-up from the Emergency Department [facility] [address] [facility]  Schedule an appointment as soon as possible for a visit  Follow-up from the Emergency Department [address]  [address] [facility], [facility] AND WOMEN[facility]S \[facility]\"" Obstetrics & Gynecology Schedule an appointment as soon as possible for a visit  Follow-up from the Emergency Department [address] [facility], [address] [place] [address]    [address] [facility] EMERGENCY DEPT Emergency Medicine  As needed, If symptoms worsen [address] [facility]  [phone]          Current Discharge Medication List      START taking these medications    Details   glycerin, adult, suppository Insert 1 Suppository into rectum daily as needed for Other (constipation) for up to 10 doses. Qty: 1 Suppository, Refills: 0      nitrofurantoin, macrocrystal-monohydrate, (Macrobid) 100 mg capsule Take 1 Cap by mouth two (2) times a day for 7 days. Qty: 14 Cap, Refills: 0         CONTINUE these medications which have CHANGED    Details   polyethylene glycol (Miralax) 17 gram/dose powder Take 17 g p.o. twice daily for 7 days, then 17 g p.o. daily for 7 days, then 17 g p.o. daily as needed constipation  Qty: 595 g, Refills: 0                   Diagnosis     Clinical Impression:   1. Cystitis with hematuria    2. Constipation, unspecified constipation type    3. Uterine leiomyoma, unspecified location    4.  Chest pain, unspecified type

## 2021-03-07 NOTE — ED TRIAGE NOTES
Patient c/o right side chest wall pain intermittently x 2 -3 weeks. She denies injury. She c/o RUQ and LUQ abdominal pain x 2 weeks. Denies vomiting and diarrhea. C/o constipation. She states having semi-hard stool today. She states hx of GERD and globus pharyngeus. She states sensation of something stuck in her throat. She states globus sensation is \"all the time\".

## 2021-03-08 LAB
ATRIAL RATE: 78 BPM
BACTERIA SPEC CULT: ABNORMAL
CALCULATED P AXIS, ECG09: 79 DEGREES
CALCULATED R AXIS, ECG10: 47 DEGREES
CALCULATED T AXIS, ECG11: 83 DEGREES
CC UR VC: ABNORMAL
DIAGNOSIS, 93000: NORMAL
P-R INTERVAL, ECG05: 184 MS
Q-T INTERVAL, ECG07: 384 MS
QRS DURATION, ECG06: 100 MS
QTC CALCULATION (BEZET), ECG08: 437 MS
SERVICE CMNT-IMP: ABNORMAL
VENTRICULAR RATE, ECG03: 78 BPM